# Patient Record
Sex: MALE | Race: ASIAN | NOT HISPANIC OR LATINO | URBAN - METROPOLITAN AREA
[De-identification: names, ages, dates, MRNs, and addresses within clinical notes are randomized per-mention and may not be internally consistent; named-entity substitution may affect disease eponyms.]

---

## 2024-05-07 ENCOUNTER — EMERGENCY (EMERGENCY)
Facility: HOSPITAL | Age: 24
LOS: 1 days | Discharge: HOME | End: 2024-05-07
Admitting: EMERGENCY MEDICINE
Payer: COMMERCIAL

## 2024-05-07 VITALS
HEART RATE: 109 BPM | OXYGEN SATURATION: 97 % | TEMPERATURE: 98 F | DIASTOLIC BLOOD PRESSURE: 64 MMHG | SYSTOLIC BLOOD PRESSURE: 121 MMHG | RESPIRATION RATE: 17 BRPM

## 2024-05-07 VITALS
RESPIRATION RATE: 16 BRPM | DIASTOLIC BLOOD PRESSURE: 75 MMHG | OXYGEN SATURATION: 97 % | HEART RATE: 91 BPM | TEMPERATURE: 98 F | SYSTOLIC BLOOD PRESSURE: 119 MMHG

## 2024-05-07 LAB — HIV 1+2 AB+HIV1 P24 AG SERPL QL IA: SIGNIFICANT CHANGE UP

## 2024-05-07 PROCEDURE — 93010 ELECTROCARDIOGRAM REPORT: CPT

## 2024-05-07 PROCEDURE — 71046 X-RAY EXAM CHEST 2 VIEWS: CPT | Mod: 26

## 2024-05-07 PROCEDURE — 99284 EMERGENCY DEPT VISIT MOD MDM: CPT

## 2024-05-07 RX ORDER — DIAZEPAM 5 MG
1 TABLET ORAL
Qty: 10 | Refills: 0
Start: 2024-05-07

## 2024-05-07 RX ORDER — ACETAMINOPHEN 500 MG
975 TABLET ORAL ONCE
Refills: 0 | Status: COMPLETED | OUTPATIENT
Start: 2024-05-07 | End: 2024-05-07

## 2024-05-07 RX ORDER — IBUPROFEN 200 MG
1 TABLET ORAL
Qty: 20 | Refills: 0
Start: 2024-05-07

## 2024-05-07 RX ORDER — IBUPROFEN 200 MG
600 TABLET ORAL ONCE
Refills: 0 | Status: COMPLETED | OUTPATIENT
Start: 2024-05-07 | End: 2024-05-07

## 2024-05-07 RX ORDER — DIAZEPAM 5 MG
5 TABLET ORAL ONCE
Refills: 0 | Status: DISCONTINUED | OUTPATIENT
Start: 2024-05-07 | End: 2024-05-07

## 2024-05-07 RX ADMIN — Medication 975 MILLIGRAM(S): at 13:31

## 2024-05-07 RX ADMIN — Medication 600 MILLIGRAM(S): at 13:32

## 2024-05-07 RX ADMIN — Medication 975 MILLIGRAM(S): at 14:30

## 2024-05-07 RX ADMIN — Medication 600 MILLIGRAM(S): at 14:30

## 2024-05-07 RX ADMIN — Medication 5 MILLIGRAM(S): at 13:31

## 2024-05-07 NOTE — ED ADULT TRIAGE NOTE - CHIEF COMPLAINT QUOTE
C/O neck pain since 1 AM. He states he was stretching out his neck and believes he overdid it and now reports increased pain with movement and activity. No significant past medical history.

## 2024-05-07 NOTE — ED PROVIDER NOTE - CLINICAL SUMMARY MEDICAL DECISION MAKING FREE TEXT BOX
24-year-old male no past medical history presents ED complaining of neck pain since 1 AM.  Patient states periodically cracks his neck and back and thinks he may have overdid it this time.  Pain is worse with movement.  States feels like he cannot turn head to the right.  Pain radiates down between shoulder blades.  Applied a topical pain cream but did not take any oral medications.  Patient also endorses intermittent chest discomfort that he has had for years.  States had another episode yesterday.  Denies any SOB, headache, dizziness, visual changes, weakness, numbness, fever.  MSK neck spasms, no neuro deficits no nuchal rigidity, noninfectious- will treat with nsaids, muscle relaxer  chest pain- check ekg, cxr

## 2024-05-07 NOTE — ED PROVIDER NOTE - NSFOLLOWUPINSTRUCTIONS_ED_ALL_ED_FT
Take motrin 600mg every 8 hours;   Valium 5mg 3x/day (do not drive when taking)  avoid any strenuous activity;  heat packs to affected area;   Drink plenty of fluids; rest;   Follow up with your pmd in 2 days.   Return to ED for any worsening pain, weakness, headache, dizziness, shortness of breath or any other concerning symptoms

## 2024-05-07 NOTE — ED PROVIDER NOTE - PROGRESS NOTE DETAILS
Pain improved with meds.  Able to range neck better. ekg/cxr wnl.  will dc with nsaids/muscle relaxer.

## 2024-05-07 NOTE — ED ADULT NURSE NOTE - OBJECTIVE STATEMENT
Patient received to intake room 10A A&Ox4, ambulatory, denies Hx coming to the ED for C/O neck pain worse on the right side since 1 AM today. He states "he was stretching out his neck when he woke up and believes he overdid it and is having difficulty turning head from side to side." Endorsing increased pain with movement and activity at this time. Denies chest pain, sob, fever, chills, N/V/D, abdominal pain, chest pain, sob, dizziness, blurry vision. Medicated as ordered. Care plan continued. Comfort measures provided. Safety maintained.

## 2024-05-07 NOTE — ED PROVIDER NOTE - OBJECTIVE STATEMENT
24-year-old male no past medical history presents ED complaining of neck pain since 1 AM.  Patient states periodically cracks his neck and back and thinks he may have overdid it this time.  Pain is worse with movement.  States feels like he cannot turn head to the right.  Pain radiates down between shoulder blades.  Applied a topical pain cream but did not take any oral medications.  Patient also endorses intermittent chest discomfort that he has had for years.  States had another episode yesterday.  Denies any SOB, headache, dizziness, visual changes, weakness, numbness, fever.

## 2024-05-07 NOTE — ED PROVIDER NOTE - PATIENT PORTAL LINK FT
You can access the FollowMyHealth Patient Portal offered by [facility] by registering at the following website: [email or web address]. By joining [employer]’s FollowMyHealth portal, you will also be able to view your health information using other applications (apps) compatible with our system.

## 2024-05-07 NOTE — ED PROVIDER NOTE - RESPIRATORY, MLM
He had a skin cancer removal procedure 1 month ago on his back and has been on antibiotics since.  He would like to make sure he can still have his procedure with you.   Breath sounds clear and equal bilaterally.

## 2024-05-08 LAB
HCV AB S/CO SERPL IA: 0.09 S/CO — SIGNIFICANT CHANGE UP (ref 0–0.99)
HCV AB SERPL-IMP: SIGNIFICANT CHANGE UP

## 2024-05-10 PROBLEM — Z78.9 OTHER SPECIFIED HEALTH STATUS: Chronic | Status: ACTIVE | Noted: 2024-05-07

## 2024-05-14 PROBLEM — Z00.00 ENCOUNTER FOR PREVENTIVE HEALTH EXAMINATION: Status: ACTIVE | Noted: 2024-05-14

## 2024-06-14 ENCOUNTER — APPOINTMENT (OUTPATIENT)
Dept: INTERNAL MEDICINE | Facility: CLINIC | Age: 24
End: 2024-06-14